# Patient Record
Sex: FEMALE | Race: WHITE
[De-identification: names, ages, dates, MRNs, and addresses within clinical notes are randomized per-mention and may not be internally consistent; named-entity substitution may affect disease eponyms.]

---

## 2017-05-09 ENCOUNTER — HOSPITAL ENCOUNTER (INPATIENT)
Dept: HOSPITAL 74 - JDEL | Age: 27
LOS: 5 days | Discharge: HOME | DRG: 540 | End: 2017-05-14
Attending: OBSTETRICS & GYNECOLOGY | Admitting: OBSTETRICS & GYNECOLOGY
Payer: COMMERCIAL

## 2017-05-09 VITALS — BODY MASS INDEX: 29.2 KG/M2

## 2017-05-09 DIAGNOSIS — Z3A.41: ICD-10-CM

## 2017-05-09 DIAGNOSIS — O48.0: ICD-10-CM

## 2017-05-09 DIAGNOSIS — O61.0: ICD-10-CM

## 2017-05-09 LAB
ANION GAP SERPL CALC-SCNC: 12 MMOL/L (ref 8–16)
APTT BLD: 28.6 SECONDS (ref 26.9–34.4)
BASOPHILS # BLD: 0.1 % (ref 0–2)
CALCIUM SERPL-MCNC: 8.6 MG/DL (ref 8.5–10.1)
CO2 SERPL-SCNC: 22 MMOL/L (ref 21–32)
COCKROFT - GAULT: 156.31
CREAT SERPL-MCNC: 0.6 MG/DL (ref 0.55–1.02)
DEPRECATED RDW RBC AUTO: 13.1 % (ref 11.6–15.6)
EOSINOPHIL # BLD: 0 % (ref 0–4.5)
GLUCOSE SERPL-MCNC: 143 MG/DL (ref 74–106)
INR BLD: 0.96 (ref 0.82–1.09)
MCH RBC QN AUTO: 27.4 PG (ref 25.7–33.7)
MCHC RBC AUTO-ENTMCNC: 33.5 G/DL (ref 32–36)
MCV RBC: 81.8 FL (ref 80–96)
NEUTROPHILS # BLD: 84.7 % (ref 42.8–82.8)
PLATELET # BLD AUTO: 171 K/MM3 (ref 134–434)
PMV BLD: 10 FL (ref 7.5–11.1)
PT PNL PPP: 10.5 SEC (ref 9.98–11.88)
WBC # BLD AUTO: 12.8 K/MM3 (ref 4–10)

## 2017-05-09 PROCEDURE — G0008 ADMIN INFLUENZA VIRUS VAC: HCPCS

## 2017-05-09 RX ADMIN — SODIUM CHLORIDE, SODIUM LACTATE, POTASSIUM CHLORIDE, CALCIUM CHLORIDE AND DEXTROSE MONOHYDRATE SCH MLS/HR: 5; 600; 310; 30; 20 INJECTION, SOLUTION INTRAVENOUS at 17:10

## 2017-05-09 RX ADMIN — SODIUM CHLORIDE, SODIUM LACTATE, POTASSIUM CHLORIDE, CALCIUM CHLORIDE AND DEXTROSE MONOHYDRATE SCH MLS/HR: 5; 600; 310; 30; 20 INJECTION, SOLUTION INTRAVENOUS at 13:37

## 2017-05-09 NOTE — PN
Progress Note (short form)





- Note


Progress Note: 


cx 2 to 3 cm, 75, vx -3 mi, fhr cat 1 contraction q 3 min , mild

## 2017-05-09 NOTE — HP
Past Medical History





- Primary Care Physician


PCP:: Raji Dyson





- Admission


Chief Complaint: pregnancy 41 weeks, labor


History Source: Patient





- Past Medical History


...: 1


...Para: 0


...LMP: 16


... Weeks Gestation by Dates: 41.0


...EDC by Dates: 17


...EDC by Sono: 17





- Past Surgical History


Hx Myomectomy: No


Hx Transabdominal Cerclage: No





- Smoking History


Smoking history: Never smoked


Have you smoked in the past 12 months: No





- Alcohol/Substance Use


Hx Alcohol Use: No





- Social History


History of Recent Travel: No





Home Medications





- Allergies


Allergies/Adverse Reactions: 


 Allergies











Allergy/AdvReac Type Severity Reaction Status Date / Time


 


No Known Allergies Allergy   Verified 17 12:03














- Home Medications


Home Medications: 


Ambulatory Orders





Pnv95/Ferrous Fumarate/FA [Prenatal Vitamin Tablet] 1 each PO DAILY 17 











Review of Systems





- Review of Systems


Constitutional: reports: No Symptoms


Eyes: reports: No Symptoms


HENT: reports: No Symptoms


Neck: reports: No Symptoms


Cardiovascular: reports: No Symptoms


Musculoskeletal: reports: No Symptoms


Integumentary: reports: No Symptoms


Neurological: reports: No Symptoms


Endocrine: reports: No Symptoms


Hematology/Lymphatic: reports: No Symptoms


Psychiatric: reports: No Symptoms





Physical Exam - Maternity


Vital Signs: 


 Vital Signs











Temperature  98.2 F   17 22:00


 


Pulse Rate  80   17 23:00


 


Respiratory Rate  20   17 23:00


 


Blood Pressure  129/79   17 23:00


 


O2 Sat by Pulse Oximetry (%)      











Constitutional: Yes: Well Nourished, No Distress, Calm


Eyes: Yes: WNL, Conjunctiva Clear, EOM Intact


HENT: Yes: WNL, Atraumatic, Normocephalic


Neck: Yes: WNL, Supple, Trachea Midline


Cardiovascular: Yes: WNL, Regular Rate and Rhythm


Breast(s): Yes: WNL





- Abdominal Exam/OB


Fundal Height: 40


Number of Fetuses: Single


Fetal Presentation: Vertex


Contractions: Yes


Regularity: Regular


Intensity: Moderate


Fetal Monitor Mode: External


Fetal Heart Rate (range): 140


Fetal Heart Rate Location: [facility]


Category: I


Accelerations: Uniform


Decelerations: None





- Vaginal Exam/OB


Vaginal Bleediing: No


Speculum Exam: No


Dilatation (cm): 3cm 


Effacement (%): 70


Amniotic Membrane Status: Intact


Meconium: Moderate


Fetal Presentation: Vertex/Position


Fetal Station: -3





- Physical Exam


Musculoskeletal: Yes: WNL


Extremities: Yes: WNL


Edema: LLE: Trace, RLE: Trace


Psychiatric: Yes: WNL





- Labs


Lab Results: 


 CBC, BMP





 17 13:25 





 17 13:25 











Problem List





- Problems


(1) Pregnancy with 41 completed weeks gestation


Code(s): Z3A.41 - 41 WEEKS GESTATION OF PREGNANCY





(2) Labor established


Code(s): PTC7627 - 








Assessment/Plan


admit, fhm,

## 2017-05-10 RX ADMIN — BUPIVACAINE HYDROCHLORIDE SCH ML: 7.5 INJECTION, SOLUTION EPIDURAL; RETROBULBAR at 15:10

## 2017-05-10 RX ADMIN — SODIUM CHLORIDE, SODIUM LACTATE, POTASSIUM CHLORIDE, CALCIUM CHLORIDE AND DEXTROSE MONOHYDRATE SCH MLS/HR: 5; 600; 310; 30; 20 INJECTION, SOLUTION INTRAVENOUS at 08:30

## 2017-05-10 RX ADMIN — SODIUM CHLORIDE, SODIUM LACTATE, POTASSIUM CHLORIDE, CALCIUM CHLORIDE AND DEXTROSE MONOHYDRATE SCH MLS/HR: 5; 600; 310; 30; 20 INJECTION, SOLUTION INTRAVENOUS at 00:48

## 2017-05-10 RX ADMIN — Medication SCH: at 10:28

## 2017-05-10 RX ADMIN — SODIUM CHLORIDE, SODIUM GLUCONATE, SODIUM ACETATE, POTASSIUM CHLORIDE, AND MAGNESIUM CHLORIDE SCH MLS/HR: 526; 502; 368; 37; 30 INJECTION, SOLUTION INTRAVENOUS at 15:30

## 2017-05-10 RX ADMIN — Medication SCH MLS/HR: at 06:00

## 2017-05-10 RX ADMIN — SODIUM CHLORIDE, SODIUM LACTATE, POTASSIUM CHLORIDE, CALCIUM CHLORIDE AND DEXTROSE MONOHYDRATE SCH: 5; 600; 310; 30; 20 INJECTION, SOLUTION INTRAVENOUS at 16:43

## 2017-05-10 NOTE — PN
Progress Note (short form)





- Note


Progress Note: 


cx 4 cm, 75 vx -2 , mr, fhr cat 1contraction q 2 min,in view of no dilation 

advised c/s, rba discussed





Problem List





- Problems


(1) Pregnancy with 41 completed weeks gestation


Code(s): Z3A.41 - 41 WEEKS GESTATION OF PREGNANCY





(2) Labor established


Code(s): YHI5869 -

## 2017-05-10 NOTE — PN
Progress Note (short form)





- Note


Progress Note: 


cx 4 cm 75 vx -2/-3 arom, clear fluid , contraction q 2 min, fhr cat 1, will 

revaluate in 3 hrs if no progress advised c/s





Problem List





- Problems


(1) Pregnancy with 41 completed weeks gestation


Code(s): Z3A.41 - 41 WEEKS GESTATION OF PREGNANCY





(2) Labor established


Code(s): MKM6314 -

## 2017-05-11 PROCEDURE — 3E033VJ INTRODUCTION OF OTHER HORMONE INTO PERIPHERAL VEIN, PERCUTANEOUS APPROACH: ICD-10-PCS | Performed by: OBSTETRICS & GYNECOLOGY

## 2017-05-11 RX ADMIN — SODIUM CHLORIDE, SODIUM LACTATE, POTASSIUM CHLORIDE, CALCIUM CHLORIDE AND DEXTROSE MONOHYDRATE SCH: 5; 600; 310; 30; 20 INJECTION, SOLUTION INTRAVENOUS at 18:30

## 2017-05-11 RX ADMIN — SODIUM CHLORIDE, SODIUM GLUCONATE, SODIUM ACETATE, POTASSIUM CHLORIDE, AND MAGNESIUM CHLORIDE SCH: 526; 502; 368; 37; 30 INJECTION, SOLUTION INTRAVENOUS at 18:31

## 2017-05-11 RX ADMIN — CEFAZOLIN SODIUM SCH MLS/HR: 1 INJECTION, SOLUTION INTRAVENOUS at 17:28

## 2017-05-11 RX ADMIN — Medication SCH TAB: at 09:39

## 2017-05-11 RX ADMIN — Medication SCH: at 18:30

## 2017-05-11 RX ADMIN — BUPIVACAINE HYDROCHLORIDE SCH: 7.5 INJECTION, SOLUTION EPIDURAL; RETROBULBAR at 18:31

## 2017-05-11 RX ADMIN — SODIUM CHLORIDE, SODIUM LACTATE, POTASSIUM CHLORIDE, CALCIUM CHLORIDE AND DEXTROSE MONOHYDRATE SCH MLS/HR: 5; 600; 310; 30; 20 INJECTION, SOLUTION INTRAVENOUS at 18:29

## 2017-05-11 RX ADMIN — ACETAMINOPHEN PRN MG: 325 TABLET ORAL at 15:38

## 2017-05-11 RX ADMIN — CEFAZOLIN SODIUM SCH MLS/HR: 1 INJECTION, SOLUTION INTRAVENOUS at 10:27

## 2017-05-11 NOTE — PN
Progress Note, Physician


Chief Complaint: 


Pt. not yet ambulating. James still in place. No HA, pain controlled. No 

anesthesia complaints.








- Current Medication List


Current Medications: 


Active Medications





Acetaminophen (Tylenol -)  650 mg PO Q4H PRN


   PRN Reason: FEVER OR PAIN


Benzocaine (Americaine Ointment -)  1 applic VA PRN PRN


   PRN Reason: PAIN


Benzocaine (Americaine 20% Spray -)  1 spray TP PRN PRN


   PRN Reason: PAIN


Bisacodyl (Dulcolax Suppository -)  10 mg RC PRN PRN


   PRN Reason: CONSTIPATION


Diphenhydramine HCl (Benadryl Injection -)  25 mg IVPUSH Q4H PRN


   PRN Reason: Pruritis


Diphenhydramine HCl (Benadryl -)  25 mg PO Q8H PRN


   PRN Reason: FOR ITCHING


Fentanyl/Bupivacaine/Sodium Chlor (Bupivicaine 0.125%/Fentanyl 2mcg/Ml Pcea -)  

50 ml EP ASDIR JUANIS


   PRN Reason: Protocol


   Last Admin: 05/10/17 15:10 Dose:  50 ml


Dextrose/Lactated Ringer's (D5-Lr -)  1,000 mls @ 125 mls/hr IV ASDIR JUANIS


   Last Admin: 05/10/17 16:43 Dose:  Not Given


Oxytocin/Lactated Ringer's (Lactated Ringer+ 15 Units Pitocin)  250 mls @ 1 mls/

hr IVPB ASDIR JUANIS; 0.06 UNIT/HR


   PRN Reason: Protocol


   Last Titration: 05/10/17 23:00 Dose:  0 unit/hr


Parenteral Electrolytes (Plasma-Lyte 148 -)  1,000 mls @ 125 mls/hr IV ASDIR JUANIS


   Last Admin: 05/10/17 15:30 Dose:  125 mls/hr


Dextrose/Lactated Ringer's (D5-Lr -)  1,000 mls @ 125 mls/hr IV ASDIR JUANIS


Cefazolin Sodium (Ancef 1 Gm Premixed Ivpb -)  50 mls @ 100 mls/hr IVPB Q8H-IV 

JUANIS


   Stop: 17 18:29


   Last Admin: 17 10:27 Dose:  100 mls/hr


Ibuprofen (Motrin -)  600 mg PO Q4H PRN


   PRN Reason: PAIN


Ibuprofen (Motrin -)  600 mg PO Q4H PRN


   PRN Reason: PAIN


Methylergonovine Maleate (Methergine Injection -)  0.2 mg IM Q4H PRN


   PRN Reason: EXCESSIVE BLEEDING


Oxycodone HCl (Roxicodone -)  5 mg PO Q4H PRN


   PRN Reason: PAIN LEVEL 1-5


Oxycodone HCl (Roxicodone -)  10 mg PO Q4H PRN


   PRN Reason: PAIN LEVEL 6-10


Prenatal Multivit/Folic Acid/Iron (Prenatal Vitamins (Sjr) -)  1 tab PO DAILY 

JUANIS


   Last Admin: 17 09:39 Dose:  1 tab


Senna/Docusate Sodium (Pericolace -)  2 tablet PO HS PRN


   PRN Reason: CONSTIPATION


Simethicone (Mylicon -)  80 mg PO Q4H PRN


   PRN Reason: GAS


Witch Hazel/Glycerin (Tucks Pads -)  1 pad TP PRN PRN


   PRN Reason: PAIN











- Objective


Vital Signs: 


 Vital Signs











Temperature  98.4 F   17 10:00


 


Pulse Rate  66   17 10:00


 


Respiratory Rate  18   17 12:00


 


Blood Pressure  135/71   17 10:00


 


O2 Sat by Pulse Oximetry (%)  100   17 00:55











Constitutional: Yes: Well Nourished, No Distress, Calm


Musculoskeletal: Yes: WNL


Neurological: Yes: WNL, Alert, Oriented


...Motor Strength: WNL


Labs: 


 CBC, BMP





 17 13:25 





 17 13:25 





 INR, PTT











INR  0.96  (0.82-1.09)   17  13:25    














Assessment/Plan


POD#1 s/p primary  under spinal. Doing well.


D/C from anesthesia care once patient voids.

## 2017-05-12 LAB
BASOPHILS # BLD: 0.6 % (ref 0–2)
DEPRECATED RDW RBC AUTO: 13.7 % (ref 11.6–15.6)
EOSINOPHIL # BLD: 0.1 % (ref 0–4.5)
MCH RBC QN AUTO: 27.4 PG (ref 25.7–33.7)
MCHC RBC AUTO-ENTMCNC: 32.7 G/DL (ref 32–36)
MCV RBC: 83.7 FL (ref 80–96)
NEUTROPHILS # BLD: 87.6 % (ref 42.8–82.8)
PLATELET # BLD AUTO: 160 K/MM3 (ref 134–434)
PMV BLD: 10.2 FL (ref 7.5–11.1)
WBC # BLD AUTO: 19 K/MM3 (ref 4–10)

## 2017-05-12 RX ADMIN — SODIUM CHLORIDE, SODIUM LACTATE, POTASSIUM CHLORIDE, CALCIUM CHLORIDE AND DEXTROSE MONOHYDRATE SCH: 5; 600; 310; 30; 20 INJECTION, SOLUTION INTRAVENOUS at 18:19

## 2017-05-12 RX ADMIN — Medication SCH: at 18:18

## 2017-05-12 RX ADMIN — Medication SCH TAB: at 10:00

## 2017-05-12 RX ADMIN — SODIUM CHLORIDE, SODIUM LACTATE, POTASSIUM CHLORIDE, CALCIUM CHLORIDE AND DEXTROSE MONOHYDRATE SCH: 5; 600; 310; 30; 20 INJECTION, SOLUTION INTRAVENOUS at 11:59

## 2017-05-12 RX ADMIN — ACETAMINOPHEN PRN MG: 325 TABLET ORAL at 09:01

## 2017-05-12 NOTE — OP
DATE OF OPERATION:  2017

 

PREOPERATIVE DIAGNOSIS:  Pregnancy at 41.1 weeks' gestation, Pitocin induction,

failure to dilate.

 

POSTOPERATIVE DIAGNOSIS:    Pregnancy at 41.1 weeks' gestation, Pitocin induction,

failure to dilate.

 

PROCEDURE:  Primary low segment transverse  section.  

 

SURGEON:  Raji Dyson MD

 

ASSISTANT:  Anh Parker DO

 

ANESTHESIA:  Epidural.

 

ANESTHESIOLOGIST:  Dr. Herrera

 

ESTIMATED BLOOD LOSS:  500 mL.

 

OPERATION:  The patient was taken to the operating room and underwent adequate

epidural anesthesia.  Abdomen and perineum were prepped and draped.  Pfannenstiel

abdominal skin incision was made.  Abdominal wall was cut layer by layer until the

peritoneum was exposed and incised.  Upon entering the abdominal cavity, the lower

uterine segment was identified and uterovesical fold of peritoneum was established.

The bladder was pushed down.  With the lower blade of the Shane retractor in the

pelvis, a low transverse incision was made. The incision was extended laterally. 

Amniotic sac was entered with clear fluid.  The head was delivered from the occiput

posterior position. The nasopharynx was suctioned.  Live baby was delivered. 

Placenta was delivered manually.  The uterine cavity was cleaned of all remaining

tissue.  Uterine incision was closed in 2 layers, 1st layer with 0 Biosyn continuous

suture and the 2nd layer with 0 Biosyn imbricating the 1st layer.  Bladder flap was

closed with 0 Biosyn continuous suture.  Both tubes and ovaries were checked and

normal.  No active bleeding was seen.  All of the lap, sponge, and instrument counts

were correct.  The peritoneum was closed with 0 Biosyn continuous suture.  Muscles

were brought together with interrupted sutures of 0 Biosyn.  Fascia was closed with 0

Biosyn continuous suture.  Subcutaneous fat was closed with interrupted suture of 0

Biosyn.  The skin was closed with staples.  Patient tolerated the procedure well and

left the OR in good condition.  

 

 

RAJI DYSON M.D. [id number]

DD: 2017 22:42

DT: 2017 01:52

Job #:  [id number]

## 2017-05-12 NOTE — PN
Progress Note (short form)





- Note


Progress Note: 


pod 1 doping well, ambulating, voids ok


 CBC, BMP





 05/09/17 13:25 





 Last Vital Signs











Temp Pulse Resp BP Pulse Ox


 


 97.9 F   67   18   123/75   100 


 


 05/11/17 22:00  05/11/17 22:00  05/12/17 00:00  05/11/17 22:00  05/11/17 00:55








abdomen soft, non tender, no distension, no cva 


uterus firm, non tender 


incision dry, clean


no calf tenderness 


lochia mild


plan ambulate , advance diet


cbc





Problem List





- Problems


(1) Pregnancy with 41 completed weeks gestation


Code(s): Z3A.41 - 41 WEEKS GESTATION OF PREGNANCY





(2) Labor established


Code(s): VWS8591 -

## 2017-05-12 NOTE — PATH
Surgical Pathology Report



Patient Name:  DINESH ARCEO

Accession #:  [id number]

Med. Rec. #:  [id number]                                                        

   /Age/Gender:  1990 (Age: 26) / F

Account:  [id number]                                                          

             Location: [facility] OBS/GYN

Taken:  5/10/2017

Received:  2017

Reported:  2017

Physicians:  Raji Dyson M.D.

  



Specimen(s) Received

 PLACENTA 





Clinical History

, 41.2 weeks post dates, failure to progress

Primary c/section







Final Diagnosis

PLACENTA, DELIVERY:

FOCALLY DISRUPTED THIRD TRIMESTER PLACENTA WITH MODERATE PREVILLOUS,

PERIVILLOUS, AND PRECHORIONIC FIBRIN DEPOSITION, DYSTROPHIC CALCIFICATIONS,

THREE VESSEL UMBILICAL CORD, AND UNREMARKABLE PLACENTAL MEMBRANES. 





***Electronically Signed***

Alexander Finkelstein, M.D.





Gross Description

The specimen is received fresh, labeled "placenta" and is a 516 gram, 18.0 x

15.0 x 2.0 cm placenta with attached membranes and umbilical cord.  The attached

membranes are tan, translucent with focal opacities and insert marginally.  The

umbilical cord measures 29 cm in length and averages 1 cm in diameter.  The cord

inserts eccentrically, 4.5 cm to the nearest margin.  No true knots or

strictures are identified. Cut surface of the umbilical cord reveals 3 vessels.

The fetal surface is grey-blue with fibrin deposition and appropriate caliber

vessels.  The maternal surface is red-brown with focal defects.  Sectioning

reveals red-brown, spongy parenchyma.  No focal lesions are identified. 

Representative sections are submitted in three cassettes as follows: 1- membrane

rolls and umbilical cord; 2-3- full thickness sections of placenta.

/2017



[facility]

## 2017-05-13 RX ADMIN — Medication SCH TAB: at 10:08

## 2017-05-13 NOTE — PN
Post Partum Progress Note





- Subjective


Subjective: 


no complains, ,


no complains


Post Partum Day: 2


Type of Delivery: Primary C/S


Vital Signs: 


 Vital Signs











Temperature  97.9 F   05/13/17 09:44


 


Pulse Rate  104 H  05/13/17 09:44


 


Respiratory Rate  20   05/13/17 09:44


 


Blood Pressure  105/73   05/13/17 09:44


 


O2 Sat by Pulse Oximetry (%)  100   05/11/17 00:55











Breast Exam: Yes: Soft, Other (BF ).  No: Engorged


Uterus: Yes: Fundus Firm, Fundus below umbilicus, Non-tender


Incision: Yes: Staples intact.  No: Redness, Oozing


Abdomen/GI: Yes: Abdomen soft, Passing flatus (BM NOT DONE ), Tolerating PO (

DIET ).  No: Abdominal Distention, Tender


Lochia: Yes: Rubra


Lochia, amount: Moderate


Extremities: Yes: Calves non-tender


Perineum: Yes: Intact


Activity: Ambulating





- Labs


Labs: 


 CBC











WBC  19.0 K/mm3 (4.0-10.0)  H D 05/12/17  07:00    


 


RBC  4.23 M/mm3 (3.60-5.2)   05/12/17  07:00    


 


Hgb  11.6 GM/dL (10.7-15.3)   05/12/17  07:00    


 


Hct  35.4 % (32.4-45.2)   05/12/17  07:00    


 


MCV  83.7 fl (80-96)   05/12/17  07:00    


 


MCHC  32.7 g/dl (32.0-36.0)   05/12/17  07:00    


 


RDW  13.7 % (11.6-15.6)   05/12/17  07:00    


 


Plt Count  160 K/MM3 (134-434)   05/12/17  07:00    


 


MPV  10.2 fl (7.5-11.1)   05/12/17  07:00    


 


Neutrophils %  87.6 % (42.8-82.8)  H  05/12/17  07:00    


 


Lymphocytes %  8.2 % (8-40)  D 05/12/17  07:00    


 


Monocytes %  3.5 % (3.8-10.2)  L  05/12/17  07:00    


 


Eosinophils %  0.1 % (0-4.5)  D 05/12/17  07:00    


 


Basophils %  0.6 % (0-2.0)  D 05/12/17  07:00    














Assessment/Plan


STABLE.


plan ct po care

## 2017-05-14 VITALS — TEMPERATURE: 98 F | DIASTOLIC BLOOD PRESSURE: 79 MMHG | HEART RATE: 87 BPM | SYSTOLIC BLOOD PRESSURE: 121 MMHG

## 2017-05-14 LAB
BASOPHILS # BLD: 0.3 % (ref 0–2)
DEPRECATED RDW RBC AUTO: 13.2 % (ref 11.6–15.6)
EOSINOPHIL # BLD: 1.8 % (ref 0–4.5)
MCH RBC QN AUTO: 27.4 PG (ref 25.7–33.7)
MCHC RBC AUTO-ENTMCNC: 33.5 G/DL (ref 32–36)
MCV RBC: 81.7 FL (ref 80–96)
NEUTROPHILS # BLD: 73.1 % (ref 42.8–82.8)
PLATELET # BLD AUTO: 182 K/MM3 (ref 134–434)
PMV BLD: 9.7 FL (ref 7.5–11.1)
WBC # BLD AUTO: 11.9 K/MM3 (ref 4–10)

## 2017-05-14 RX ADMIN — Medication SCH TAB: at 09:50

## 2017-05-14 NOTE — PN
Post Partum Progress Note





- Subjective


Subjective: 


no complains 





Post Partum Day: 3


Type of Delivery: Primary C/S


Vital Signs: 


 Vital Signs











Temperature  97.6 F   05/13/17 21:09


 


Pulse Rate  67   05/13/17 21:09


 


Respiratory Rate  20   05/13/17 21:09


 


Blood Pressure  129/74   05/13/17 21:09


 


O2 Sat by Pulse Oximetry (%)  100   05/11/17 00:55











Breast Exam: Yes: Soft, Other (BF ).  No: Engorged


Uterus: Yes: Fundus Firm, Fundus below umbilicus, Non-tender


Incision: Yes: Staples intact.  No: Redness, Oozing


Abdomen/GI: Yes: Abdomen soft, Passing flatus, Tolerating PO (diet).  No: 

Abdominal Distention, Tender


Lochia: Yes: Rubra


Lochia, amount: Moderate


Extremities: Yes: Calves non-tender


Perineum: Yes: Intact


Activity: Ambulating





- Labs


Labs: 


 CBC











WBC  19.0 K/mm3 (4.0-10.0)  H D 05/12/17  07:00    


 


RBC  4.23 M/mm3 (3.60-5.2)   05/12/17  07:00    


 


Hgb  11.6 GM/dL (10.7-15.3)   05/12/17  07:00    


 


Hct  35.4 % (32.4-45.2)   05/12/17  07:00    


 


MCV  83.7 fl (80-96)   05/12/17  07:00    


 


MCHC  32.7 g/dl (32.0-36.0)   05/12/17  07:00    


 


RDW  13.7 % (11.6-15.6)   05/12/17  07:00    


 


Plt Count  160 K/MM3 (134-434)   05/12/17  07:00    


 


MPV  10.2 fl (7.5-11.1)   05/12/17  07:00    


 


Neutrophils %  87.6 % (42.8-82.8)  H  05/12/17  07:00    


 


Lymphocytes %  8.2 % (8-40)  D 05/12/17  07:00    


 


Monocytes %  3.5 % (3.8-10.2)  L  05/12/17  07:00    


 


Eosinophils %  0.1 % (0-4.5)  D 05/12/17  07:00    


 


Basophils %  0.6 % (0-2.0)  D 05/12/17  07:00    














Assessment/Plan


stable. 


plan discharge today

## 2017-05-16 NOTE — DS
Physical Exam-GYN


Vital Signs: 


 Vital Signs











Temperature  98 F   17 08:12


 


Pulse Rate  87   17 08:12


 


Respiratory Rate  20   17 08:12


 


Blood Pressure  121/79   17 08:12


 


O2 Sat by Pulse Oximetry (%)  100   17 00:55











Constitutional: Yes: Well Nourished, No Distress, Calm


Eyes: Yes: WNL, Conjunctiva Clear, EOM Intact


HENT: Yes: WNL, Atraumatic, Normocephalic


Neck: Yes: WNL, Supple, Trachea Midline


Cardiovascular: Yes: WNL, Regular Rate and Rhythm


Respiratory: Yes: WNL, Regular, CTA Bilaterally


Gastrointestinal: Yes: WNL


...Rectal Exam: Yes: WNL


Renal/: Yes: WNL


External Genitalia: Yes: Normal


Vaginal Exam: Yes: Normal


....Post Partum: Yes: Uterus firm, Uterus non-tender, Slight lochia rubra


Breast(s): Yes: WNL


Musculoskeletal: Yes: WNL


Extremities: Yes: WNL


Integumentary: Yes: WNL


Wound/Incision: Yes: Clean/Dry, Well Approximated


Neurological: Yes: WNL, Alert, Oriented


...Motor Strength: WNL


Psychiatric: Yes: WNL, Alert, Oriented


Labs: 


 CBC, BMP





 17 06:10 





 17 13:25 











Delivery





- Delivery


 Section: Primary (no complication), Low Flap Transverse


Type of Anesthesia: Epidural


Episiotomy/Laceration: None


EBL (cc): 500





Delivery, Single Birth





- Stages of Labor


Date 1st Stage Initiatied: 05/10/17


Time 1st Stage Initiated: 15:00


Date of Delivery: 17


Time of Delivery: 00:12


Time Placenta Delivered: 00:13


Placenta: Yes: Manual Removal





- Condition of Infant


Pediatrician/Neonatologist Present: No


Infant Gender: Male


Birth Weight: 8 lb 15 oz


Position: OP


Total Hours ROM (Hrs/Mins): 5hrs/53mins





- Apgar


  ** 1 Minute


Apgar Total Score: 9





  ** 5 Minutes


Apgar Total Score: 9





- [place] Feeding Plan


Initial Plan: Exclusive breastfeeding throughout hospitalization





Discharge Summary


Reason For Visit: POST DATES,EARLY LABOR


Procedures: Principal: primary lst c/s


Condition: Stable





- Instructions


Diet, Activity, Other Instructions: 


Post Partum Instructions





DIET:


Continue good diet high in protein, calcium, and iron rich foods. Drink at 

least eight (8) glasses of water daily in addition to other fluids.


ct    Regular diet











MEDICATIONS:


Continue prenatal vitamins and iron as previously directed. Motrin and Tylenol 

may be taken for minor discomfort. 





ACTIVITY:


Mild to moderate exercise may be started in two (2) weeks. Take frequent rest 

periods. Resume normal activity after six (6) week check up. 





WOUND CARE OF OPERATIVE SITE:


Continue use of perineal bottle until vaginal discharge stops. Keep area clean. 

Shower daily. Keep abdominal wound dry. Report any drainage or redness to 

physician. Tub baths, tampons and douches are not permitted for 6 weeks.





ct  Breast feeding & 0r  Bottle feeding





BREAST CARE: (For those that are not breast feeding): If engorgement occurs:


Wear tight fitting bra.


Take Tylenol or Motrin for pain.


Apply cold packs (ice in bags to each breast )





FAMILY PLANNING:


There are many birth control alternatives to pursue and they should be 

discussed at your first office visit. You may resume sexual activity after your 

six (6) week check up. (Remember, breastfeeding is not a contraceptive)





NEXT PHYSICIAN APPOINTMENT:


Be certain to call for a one  (1) week appointment, unless otherwise directed.  

call [phone] for appt with Bo Dyson for  removal of staples on 


call [facility] for appointment. [phone]


Call Clinic or got to Emergency Dept if you have any of the following:


   Heavy vaginal bleeding


   Painful urination


   Leg pain


   Unusual odor noted to vaginal bleeding


   High fever


   Red streaking noted on breast








Referrals: 


Raji Dyson MD [Staff Physician] - 


Disposition: HOME





- Home Medications


Comprehensive Discharge Medication List: 


Ambulatory Orders





Pnv95/Ferrous Fumarate/FA [Prenatal Vitamin Tablet] 1 each PO DAILY 17 


Acetaminophen [Tylenol .Regular Strength -] 650 mg PO Q4H PRN #0 tablet  


Ibuprofen [Motrin -] 600 mg PO Q4H PRN #30 tablet 17 


Prenatal Vitamins (Sjr) - 1 tab PO DAILY  tablet 17

## 2023-08-28 ENCOUNTER — HOSPITAL ENCOUNTER (INPATIENT)
Dept: HOSPITAL 74 - JLDR | Age: 33
LOS: 2 days | Discharge: HOME | DRG: 540 | End: 2023-08-30
Attending: OBSTETRICS & GYNECOLOGY | Admitting: OBSTETRICS & GYNECOLOGY
Payer: COMMERCIAL

## 2023-08-28 VITALS — BODY MASS INDEX: 28.7 KG/M2

## 2023-08-28 DIAGNOSIS — Z3A.39: ICD-10-CM

## 2023-08-28 DIAGNOSIS — Z30.2: ICD-10-CM

## 2023-08-28 DIAGNOSIS — N85.8: ICD-10-CM

## 2023-08-28 DIAGNOSIS — O34.211: Primary | ICD-10-CM

## 2023-08-28 LAB
ARTERIAL PATENCY WRIST A: POSITIVE
BASE EXCESS BLDA CALC-SCNC: -3.6 MMOL/L (ref -2–2)
BASE EXCESS BLDV CALC-SCNC: -4.2 MMOL/L (ref -2–2)
HCT VFR BLDV CALC: 39 % (ref 32.4–45.2)
HCT VFR BLDV CALC: 41 % (ref 32.4–45.2)
PCO2 BLDA: 51.4 MMHG (ref 35–45)
PCO2 BLDV: 42.9 MMHG (ref 38–52)
PH BLDV: 7.32 [PH] (ref 7.31–7.41)
PO2 BLDA: 18.8 MMHG (ref 80–100)
SAO2 % BLDA: 23.8 % (ref 95–98)
SAO2 % BLDV: 53.2 % (ref 70–80)

## 2023-08-28 PROCEDURE — 0UB70ZZ EXCISION OF BILATERAL FALLOPIAN TUBES, OPEN APPROACH: ICD-10-PCS | Performed by: OBSTETRICS & GYNECOLOGY

## 2023-08-28 RX ADMIN — Medication SCH MLS/HR: at 10:40

## 2023-08-28 RX ADMIN — Medication SCH MLS/HR: at 17:35

## 2023-08-29 LAB
BASOPHILS # BLD: 0.1 % (ref 0–2)
DEPRECATED RDW RBC AUTO: 14.6 % (ref 11.6–15.6)
EOSINOPHIL # BLD: 0.2 % (ref 0–4.5)
HCT VFR BLD CALC: 34.1 % (ref 32.4–45.2)
HGB BLD-MCNC: 11.4 GM/DL (ref 10.7–15.3)
LYMPHOCYTES # BLD: 13.9 % (ref 8–40)
MCH RBC QN AUTO: 25.5 PG (ref 25.7–33.7)
MCHC RBC AUTO-ENTMCNC: 33.3 G/DL (ref 32–36)
MCV RBC: 76.7 FL (ref 80–96)
MONOCYTES # BLD AUTO: 5.7 % (ref 3.8–10.2)
NEUTROPHILS # BLD: 80.1 % (ref 42.8–82.8)
PLATELET # BLD AUTO: 194 10^3/UL (ref 134–434)
PMV BLD: 9.7 FL (ref 7.5–11.1)
RBC # BLD AUTO: 4.45 M/MM3 (ref 3.6–5.2)
WBC # BLD AUTO: 14.9 K/MM3 (ref 4–10)

## 2023-08-29 RX ADMIN — SIMETHICONE CHEW TAB 80 MG PRN MG: 80 TABLET ORAL at 18:02

## 2023-08-29 RX ADMIN — IBUPROFEN PRN MG: 600 TABLET, FILM COATED ORAL at 18:02

## 2023-08-29 RX ADMIN — IBUPROFEN PRN MG: 600 TABLET, FILM COATED ORAL at 09:36

## 2023-08-29 RX ADMIN — SIMETHICONE CHEW TAB 80 MG PRN MG: 80 TABLET ORAL at 01:43

## 2023-08-29 RX ADMIN — ENOXAPARIN SODIUM SCH MG: 40 INJECTION SUBCUTANEOUS at 09:35

## 2023-08-29 RX ADMIN — SIMETHICONE CHEW TAB 80 MG PRN MG: 80 TABLET ORAL at 09:35

## 2023-08-29 RX ADMIN — Medication SCH TAB: at 09:35

## 2023-08-30 VITALS
SYSTOLIC BLOOD PRESSURE: 127 MMHG | HEART RATE: 79 BPM | DIASTOLIC BLOOD PRESSURE: 81 MMHG | RESPIRATION RATE: 20 BRPM | TEMPERATURE: 98.1 F

## 2023-08-30 RX ADMIN — IBUPROFEN PRN MG: 600 TABLET, FILM COATED ORAL at 05:59

## 2023-08-30 RX ADMIN — SIMETHICONE CHEW TAB 80 MG PRN MG: 80 TABLET ORAL at 05:59

## 2023-08-30 RX ADMIN — Medication SCH TAB: at 09:33

## 2023-08-30 RX ADMIN — ENOXAPARIN SODIUM SCH MG: 40 INJECTION SUBCUTANEOUS at 09:33
